# Patient Record
Sex: FEMALE | Race: WHITE | NOT HISPANIC OR LATINO | Employment: FULL TIME | URBAN - METROPOLITAN AREA
[De-identification: names, ages, dates, MRNs, and addresses within clinical notes are randomized per-mention and may not be internally consistent; named-entity substitution may affect disease eponyms.]

---

## 2018-10-05 ENCOUNTER — TELEPHONE (OUTPATIENT)
Dept: NEUROLOGY | Facility: CLINIC | Age: 53
End: 2018-10-05

## 2018-10-05 NOTE — TELEPHONE ENCOUNTER
Since this is urgent request I scheduled 10/18 with dr carmona.pt was happy with that. 10/05/18/0940/mikayla

## 2018-10-08 ENCOUNTER — TELEPHONE (OUTPATIENT)
Dept: NEUROLOGY | Facility: CLINIC | Age: 53
End: 2018-10-08

## 2018-10-08 ENCOUNTER — OFFICE VISIT (OUTPATIENT)
Dept: NEUROLOGY | Facility: CLINIC | Age: 53
End: 2018-10-08
Payer: COMMERCIAL

## 2018-10-08 VITALS
RESPIRATION RATE: 20 BRPM | WEIGHT: 231.69 LBS | HEART RATE: 74 BPM | SYSTOLIC BLOOD PRESSURE: 128 MMHG | DIASTOLIC BLOOD PRESSURE: 84 MMHG | BODY MASS INDEX: 39.55 KG/M2 | HEIGHT: 64 IN

## 2018-10-08 DIAGNOSIS — Z86.69 HISTORY OF EPILEPSY: ICD-10-CM

## 2018-10-08 DIAGNOSIS — R55 FAINTING SPELL: ICD-10-CM

## 2018-10-08 DIAGNOSIS — F43.29 STRESS AND ADJUSTMENT REACTION: ICD-10-CM

## 2018-10-08 PROCEDURE — 3008F BODY MASS INDEX DOCD: CPT | Mod: CPTII,S$GLB,, | Performed by: PSYCHIATRY & NEUROLOGY

## 2018-10-08 PROCEDURE — 99999 PR PBB SHADOW E&M-EST. PATIENT-LVL III: CPT | Mod: PBBFAC,,, | Performed by: PSYCHIATRY & NEUROLOGY

## 2018-10-08 PROCEDURE — 99205 OFFICE O/P NEW HI 60 MIN: CPT | Mod: S$GLB,,, | Performed by: PSYCHIATRY & NEUROLOGY

## 2018-10-08 RX ORDER — LEVETIRACETAM 500 MG/1
500 TABLET ORAL 2 TIMES DAILY
Qty: 60 TABLET | Refills: 11 | Status: SHIPPED | OUTPATIENT
Start: 2018-10-08 | End: 2019-10-08

## 2018-10-08 RX ORDER — IBUPROFEN 200 MG
200 TABLET ORAL EVERY 6 HOURS PRN
COMMUNITY

## 2018-10-08 RX ORDER — ACETAMINOPHEN 325 MG/1
325 TABLET ORAL EVERY 6 HOURS PRN
COMMUNITY

## 2018-10-08 NOTE — TELEPHONE ENCOUNTER
----- Message from Harman Guillaume sent at 10/8/2018 11:21 AM CDT -----  Contact: [phone]   Primary Care doctor fax number is  [phone] and sen it to the Attention of  Rama Whiting.  Please call back at [phone]. x-AH

## 2018-10-08 NOTE — PROGRESS NOTES
Subjective:       Patient ID: Melanie Pedro is a 52 y.o. female.    Chief Complaint:  Seizures (consult)      HPI  HISTORY OF PRESENT ILLNESS:  This is a 52-year-old right-handed lady with   history of seizure in the past, who presented today in the clinic for evaluation   of recent seizures incidence on 10/03/2018.  She is a schoolteacher, recently   removed to a new school and in the new school she is under a lot of mental   pressure, stress, anxiety because of the environment there.  As per her seizure   history, she said that she might have fainting spell as a child, but never was   taken to the doctor.  At age 19, she has first grand mal seizure and after that   she has some mixed episodes, some grand mal, some staring spells.  Her last   staring spell was at age 23.  Usually, she did not take any medications and she   has not been evaluated thoroughly at that age.  After that, she did not have any   incidence until recently.  She is going through a lot of mental pressure,   stress, anxiety continuously.  So on October 3rd at school, she had the   convulsive seizure taken to the hospital, but in the hospital, CT of head and CT of  the C-spine   was done and came out unremarkable.  Hospital records showed that she has no   incontinence or tongue biting, but she said now that she has some incontinence.    In this situation, she is here for neurological evaluations to be done   thoroughly.    Social History     Tobacco Use    Smoking status: Never Smoker   Substance Use Topics    Alcohol use: No     Frequency: Never    Drug use: No     Review of patient's allergies indicates:   Allergen Reactions    Latex, natural rubber Swelling    Decadron [dexamethasone sodium phosphate]        Scheduled Meds:          Review of Systems   Constitutional: Negative for activity change, appetite change, diaphoresis, fatigue, fever and unexpected weight change.   HENT: Negative for drooling, facial swelling, hearing loss,  tinnitus and voice change.    Eyes: Negative for photophobia, pain and visual disturbance.   Respiratory: Negative for apnea, cough, chest tightness and shortness of breath.    Cardiovascular: Negative for chest pain, palpitations and leg swelling.   Gastrointestinal: Negative for nausea.   Genitourinary: Negative for difficulty urinating, dyspareunia, flank pain, frequency and pelvic pain.   Musculoskeletal: Negative for arthralgias, back pain, gait problem, joint swelling, myalgias, neck pain and neck stiffness.   Skin: Negative for pallor and rash.   Neurological: Positive for seizures. Negative for dizziness, tremors, syncope, facial asymmetry, speech difficulty, weakness, light-headedness, numbness and headaches.   Hematological: Does not bruise/bleed easily.   Psychiatric/Behavioral: Negative for agitation, behavioral problems, confusion, decreased concentration, dysphoric mood, hallucinations, sleep disturbance and suicidal ideas. The patient is nervous/anxious.        Objective:        Neurologic Exam     Mental Status   Oriented to person, place, and time.   Recall at 5 minutes: recalls 3 of 3 objects. Follows 3 step commands.   Speech: speech is normal   Level of consciousness: alert  Knowledge: good and consistent with education. Able to perform simple calculations.   Able to name object. Able to read. Able to repeat. Normal comprehension.     Cranial Nerves     CN II   Visual fields full to confrontation.   Visual acuity: normal    CN III, IV, VI   Pupils are equal, round, and reactive to light.  Extraocular motions are normal.   Right pupil: Shape: regular. Reactivity: brisk. Consensual response: intact. Accommodation: intact.   Left pupil: Shape: regular. Reactivity: brisk. Consensual response: intact. Accommodation: intact.   CN III: no CN III palsy  CN VI: no CN VI palsy  Nystagmus: none   Diplopia: none  Ophthalmoparesis: none  Upgaze: normal  Downgaze: normal  Conjugate gaze:  present  Vestibulo-ocular reflex: present    CN VIII   CN VIII normal.     CN IX, X   CN IX normal.   CN X normal.   Palate: symmetric  Right gag reflex: normal  Left gag reflex: normal    CN XI   CN XI normal.     CN XII   CN XII normal.     Motor Exam   Muscle bulk: normal  Overall muscle tone: normal  Right arm tone: normal  Left arm tone: normal  Right leg tone: normal  Left leg tone: normal    Strength   Strength 5/5 throughout.     Sensory Exam   Light touch normal.   Proprioception normal.   Pinprick normal.   Graphesthesia: normal  Stereognosis: normal    Gait, Coordination, and Reflexes     Gait  Gait: normal    Coordination   Romberg: negative  Finger to nose coordination: normal  Heel to shin coordination: normal  Tandem walking coordination: normal    Tremor   Resting tremor: absent  Intention tremor: absent  Action tremor: absent    Reflexes   Right brachioradialis: 2+  Left brachioradialis: 2+  Right biceps: 2+  Left biceps: 2+  Right triceps: 2+  Left triceps: 2+  Right patellar: 2+  Left patellar: 2+  Right achilles: 2+  Left achilles: 2+  Right : 2+  Left : 2+  Right plantar: normal  Left plantar: normal  Right ankle clonus: absent  Left ankle clonus: absent             Assessment:     Problem List Items Addressed This Visit     History of epilepsy    Current Assessment & Plan     She is under a lot of stress and anxiety which might be triggering the seizure..  MRI of the brain and EEG are required.         Stress and adjustment reaction    Fainting spell               Plan:   MRI of the brain. EEG and Keppra 500 mg bid  Time spent: 40 minutes in face to face discussion concerning diagnosis, prognosis, review of lab and test results, benefits of treatment as well as management of disease, counseling of patient and coordination of care between various health care providers . Greater than half the time spent was used for coordination of care and counseling of patient. This note may have some  spelling or wording mistakes which might have been overlooked during proof reading.

## 2018-10-08 NOTE — TELEPHONE ENCOUNTER
----- Message from Neeta Granados sent at 10/8/2018 10:42 AM CDT -----  Contact: Patient  Patient needs a Dr note for her to give to her daughters school because her daughter had to bring her to her appt today, please call her back at [phone]. Thank you

## 2018-10-08 NOTE — TELEPHONE ENCOUNTER
Pt needs a school letter for bringing her here. Printed it to fax to her at her pcp office.since dhe is on her way there and forgot to get it while here.10/08/18/1050/sf

## 2018-10-08 NOTE — TELEPHONE ENCOUNTER
----- Message from Alea Monsivais sent at 10/8/2018 11:01 AM CDT -----  Contact: self/[phone]  Returning call to Cammy Heller, please call back at [phone]. Thanks/ar

## 2018-10-08 NOTE — ASSESSMENT & PLAN NOTE
She is under a lot of stress and anxiety which might be triggering the seizure..  MRI of the brain and EEG are required.

## 2018-10-08 NOTE — LETTER
October 8, 2018      Rory Lang MD  [address]  [place] [address]           [facility]  [id number] [place] [id number]  Phone: [phone]  Fax: [phone]          Patient: Melanie Pedro   MR Number: [id number]   YOB: 1965   Date of Visit: 10/8/2018       Dear Dr. Rory Lang:    Thank you for referring Melanie Pedro to me for evaluation. Attached you will find relevant portions of my assessment and plan of care.    If you have questions, please do not hesitate to call me. I look forward to following Melanie Pedro along with you.    Sincerely,    Morgan Ng MD    Enclosure  CC:  No Recipients    If you would like to receive this communication electronically, please contact [email or web address] or [phone] to request more information on [facility] access.    For providers and/or their staff who would like to refer a patient to [facility], please contact us through our one-stop-shop provider referral line, [facility], at [phone].    If you feel you have received this communication in error or would no longer like to receive these types of communications, please e-mail [email or web address]

## 2018-10-11 ENCOUNTER — PATIENT MESSAGE (OUTPATIENT)
Dept: NEUROLOGY | Facility: CLINIC | Age: 53
End: 2018-10-11

## 2018-10-11 ENCOUNTER — TELEPHONE (OUTPATIENT)
Dept: NEUROLOGY | Facility: CLINIC | Age: 53
End: 2018-10-11

## 2018-10-11 NOTE — TELEPHONE ENCOUNTER
----- Message from Umu Gould sent at 10/11/2018 10:27 AM CDT -----  Contact: self   Requesting a call back regarding medical leave information. Please call back at [phone].      Thanks,  Umu Gould

## 2018-10-11 NOTE — TELEPHONE ENCOUNTER
----- Message from Rocael Morris sent at 10/11/2018 12:50 PM CDT -----  Contact: Meg [phone]  Patient is returning a missed call to Minnie.

## 2018-10-11 NOTE — TELEPHONE ENCOUNTER
Pt is worried about effects of keppra since daughter was unable to take for same dx. Wants to know if he can change to topamax instead. Sent a message to dr jo. 10/11/18/1305/sf

## 2018-10-12 RX ORDER — TOPIRAMATE 25 MG/1
TABLET ORAL
Qty: 60 TABLET | Refills: 11 | Status: SHIPPED | OUTPATIENT
Start: 2018-10-12 | End: 2018-10-30 | Stop reason: SDUPTHER

## 2018-10-16 ENCOUNTER — HOSPITAL ENCOUNTER (OUTPATIENT)
Dept: PULMONOLOGY | Facility: HOSPITAL | Age: 53
Discharge: HOME | End: 2018-10-16
Attending: PSYCHIATRY & NEUROLOGY
Payer: COMMERCIAL

## 2018-10-16 ENCOUNTER — TELEPHONE (OUTPATIENT)
Dept: NEUROLOGY | Facility: CLINIC | Age: 53
End: 2018-10-16

## 2018-10-16 DIAGNOSIS — R55 FAINTING SPELL: ICD-10-CM

## 2018-10-16 DIAGNOSIS — Z86.69 HISTORY OF EPILEPSY: ICD-10-CM

## 2018-10-16 DIAGNOSIS — F43.29 STRESS AND ADJUSTMENT REACTION: ICD-10-CM

## 2018-10-16 PROCEDURE — 95816 EEG AWAKE AND DROWSY: CPT

## 2018-10-16 NOTE — TELEPHONE ENCOUNTER
----- Message from Payal Webster MA sent at 10/16/2018  8:00 AM CDT -----   Please advise  ----- Message -----  From: Morgan Ng MD  Sent: 10/12/2018   5:53 PM  To: Payal Webster MA    Wait for EEG report.  ----- Message -----  From: Payal Webster MA  Sent: 10/12/2018   1:22 PM  To: Morgan Ng MD    Please advise  ----- Message -----  From: Brianna Aceves  Sent: 10/12/2018   1:18 PM  To: Hernandez GARCIA Staff    states that since 8/19 she's had 3 absent seizures, 1 had to be taken to hospital, 1 tonic seizure (lastest 10/10)...just needs to clarify meaning of back to back seizures discussed earlier. also wants to know if she still isn't able to drive..[phone] ([place])

## 2018-10-17 NOTE — PROCEDURES
Referring physician: Dr. Ng     Technician : Flcao Quezada     Recording time:               20 minutes    Artifacts:   Eye movements , muscle .    Age:        52                                          Sex: F      History:   This is a 52-year-old right-handed lady with history of seizure in the past, who presented today in the clinic for evaluation of recent seizures incidence on 10/03/2018.  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  As per her seizure history, she said that she might have fainting spell as a child, but never was taken to the doctor.  At age 19, she has first grand mal seizure and after that she has some mixed episodes, some grand mal, some staring spells.  Her last staring spell was at age 23.  Usually, she did not take any medications and she has not been evaluated thoroughly at that age.  After that, she did not have anyincidence until recently.  She is going through a lot of mental pressure,   stress, anxiety continuously.  So on October 3rd at school, she had the convulsive seizure taken to the hospital, but in the hospital, CT of head and CT of  the C-spine was done and came out unremarkable    Technique : Electrodes are placed according to International 10-20 system . Bipolar and referential montages are used. Hyperventilation was   done   . Photic was  done.      Findings:  This is a multichannel digital EEG recording using the international 10-20 placement system. The resting record is fairly well organized and symmetric. A dominant posterior rhythm is seen. It consists of a 10  hertz 20-70 microvolt alpha rhythm. This attenuates with eye opening. During drowsiness, there is mild attenuation and slowing of the background rhythm. Stage II sleep was  achieved. Hyperventilation was     performed. Photic stimulation was  done .There is no change in the back  ground .There is no spikes or spike waves activities in this EEG. No seizure or epileptiform discharge are appreciated.  There is no focal attenuation or side to side variation. No clinical seizure activity was noted by the EEG technician.     IMPRESSION:  This is a normal awake and drowsy EEG study. Clinical correlation appreciated.

## 2018-10-30 RX ORDER — TOPIRAMATE 25 MG/1
TABLET ORAL
Qty: 60 TABLET | Refills: 11 | Status: SHIPPED | OUTPATIENT
Start: 2018-10-30

## 2018-10-30 NOTE — TELEPHONE ENCOUNTER
----- Message from [place] sent at 10/30/2018 12:40 PM CDT -----  1. What is the name of the medication you are requesting? topamax  2. What is the dose? 25mg now but dr said 75mg for next wk & then 100mg  3. How do you take the medication? Orally, topically, etc? oral  4. How often do you take this medication? 25 mg 3/day  5. Do you need a 30 day or 90 day supply? n/a  6. How many refills are you requesting? n/a  7. What is your preferred pharmacy and location of the pharmacy?   [facility] [address] [place] [address], [place] - [address] [facility]  [address] [facility]  [place] [address]  Phone: [phone] Fax: [phone]    8. Who can we contact with further questions? [phone] (home)

## 2019-01-11 ENCOUNTER — TELEPHONE (OUTPATIENT)
Dept: NEUROLOGY | Facility: CLINIC | Age: 54
End: 2019-01-11

## 2019-01-11 NOTE — TELEPHONE ENCOUNTER
Advised pt to call the number on back of card to see if she still has coverage for her visit. She says she has applied for v.a. Benefits also and will let us know what she finds out.1/11/1911/19/1056/sf

## 2019-01-11 NOTE — TELEPHONE ENCOUNTER
----- Message from Bear Muller sent at 1/11/2019  9:47 AM CST -----  Contact: Pt.   Pt is returning missed call ..[phone] (home) Pt states that she is needing nurse to contact insurance company to se if Pt still as coverage for this appt.          Thank You  Alexus Muller

## 2019-01-14 ENCOUNTER — TELEPHONE (OUTPATIENT)
Dept: NEUROLOGY | Facility: CLINIC | Age: 54
End: 2019-01-14

## 2019-01-14 ENCOUNTER — OFFICE VISIT (OUTPATIENT)
Dept: NEUROLOGY | Facility: CLINIC | Age: 54
End: 2019-01-14
Payer: COMMERCIAL

## 2019-01-14 VITALS
HEART RATE: 62 BPM | RESPIRATION RATE: 20 BRPM | HEIGHT: 63 IN | WEIGHT: 237.44 LBS | SYSTOLIC BLOOD PRESSURE: 130 MMHG | DIASTOLIC BLOOD PRESSURE: 78 MMHG | BODY MASS INDEX: 42.07 KG/M2

## 2019-01-14 DIAGNOSIS — F43.29 STRESS AND ADJUSTMENT REACTION: ICD-10-CM

## 2019-01-14 DIAGNOSIS — Z86.69 HISTORY OF EPILEPSY: Primary | ICD-10-CM

## 2019-01-14 PROCEDURE — 99999 PR PBB SHADOW E&M-EST. PATIENT-LVL III: ICD-10-PCS | Mod: PBBFAC,,, | Performed by: PSYCHIATRY & NEUROLOGY

## 2019-01-14 PROCEDURE — 3008F PR BODY MASS INDEX (BMI) DOCUMENTED: ICD-10-PCS | Mod: CPTII,S$GLB,, | Performed by: PSYCHIATRY & NEUROLOGY

## 2019-01-14 PROCEDURE — 99214 PR OFFICE/OUTPT VISIT, EST, LEVL IV, 30-39 MIN: ICD-10-PCS | Mod: S$GLB,,, | Performed by: PSYCHIATRY & NEUROLOGY

## 2019-01-14 PROCEDURE — 99214 OFFICE O/P EST MOD 30 MIN: CPT | Mod: S$GLB,,, | Performed by: PSYCHIATRY & NEUROLOGY

## 2019-01-14 PROCEDURE — 3008F BODY MASS INDEX DOCD: CPT | Mod: CPTII,S$GLB,, | Performed by: PSYCHIATRY & NEUROLOGY

## 2019-01-14 PROCEDURE — 99999 PR PBB SHADOW E&M-EST. PATIENT-LVL III: CPT | Mod: PBBFAC,,, | Performed by: PSYCHIATRY & NEUROLOGY

## 2019-01-14 RX ORDER — MECLIZINE HYDROCHLORIDE CHEWABLE TABLETS 25 MG/1
16 TABLET, CHEWABLE ORAL 3 TIMES DAILY PRN
COMMUNITY

## 2019-01-14 NOTE — PROGRESS NOTES
Subjective:       Follow up:   Seizures (consult)      HPI  She has stopped taking Keppra and started Topamax in the beginning but she felt some side effects , like upset stomach  With 25 , sleepiness with 75 mg and above. She also developed some mild reaction to higher doses. She backed down to 25 mg bid.  Last seizure was in October 10th.    Social History     Tobacco Use    Smoking status: Never Smoker   Substance Use Topics    Alcohol use: No     Frequency: Never    Drug use: No     Review of patient's allergies indicates:   Allergen Reactions    Latex, natural rubber Swelling    Decadron [dexamethasone sodium phosphate]        Scheduled Meds:          Review of Systems   Constitutional: Negative for activity change, appetite change, diaphoresis, fatigue, fever and unexpected weight change.   HENT: Negative for drooling, facial swelling, hearing loss, tinnitus and voice change.    Eyes: Negative for photophobia, pain and visual disturbance.   Respiratory: Negative for apnea, cough, chest tightness and shortness of breath.    Cardiovascular: Negative for chest pain, palpitations and leg swelling.   Gastrointestinal: Negative for nausea.   Genitourinary: Negative for difficulty urinating, dyspareunia, flank pain, frequency and pelvic pain.   Musculoskeletal: Negative for arthralgias, back pain, gait problem, joint swelling, myalgias, neck pain and neck stiffness.   Skin: Negative for pallor and rash.   Neurological: Positive for seizures. Negative for dizziness, tremors, syncope, facial asymmetry, speech difficulty, weakness, light-headedness, numbness and headaches.   Hematological: Does not bruise/bleed easily.   Psychiatric/Behavioral: Negative for agitation, behavioral problems, confusion, decreased concentration, dysphoric mood, hallucinations, sleep disturbance and suicidal ideas. The patient is nervous/anxious.        Objective:        Neurologic Exam     Mental Status   Oriented to person, place, and  time.   Recall at 5 minutes: recalls 3 of 3 objects. Follows 3 step commands.   Speech: speech is normal   Level of consciousness: alert  Knowledge: good and consistent with education. Able to perform simple calculations.   Able to name object. Able to read. Able to repeat. Normal comprehension.     Cranial Nerves     CN II   Visual fields full to confrontation.   Visual acuity: normal    CN III, IV, VI   Pupils are equal, round, and reactive to light.  Extraocular motions are normal.   Right pupil: Shape: regular. Reactivity: brisk. Consensual response: intact. Accommodation: intact.   Left pupil: Shape: regular. Reactivity: brisk. Consensual response: intact. Accommodation: intact.   CN III: no CN III palsy  CN VI: no CN VI palsy  Nystagmus: none   Diplopia: none  Ophthalmoparesis: none  Upgaze: normal  Downgaze: normal  Conjugate gaze: present  Vestibulo-ocular reflex: present    CN VIII   CN VIII normal.     CN IX, X   CN IX normal.   CN X normal.   Palate: symmetric  Right gag reflex: normal  Left gag reflex: normal    CN XI   CN XI normal.     CN XII   CN XII normal.     Motor Exam   Muscle bulk: normal  Overall muscle tone: normal  Right arm tone: normal  Left arm tone: normal  Right leg tone: normal  Left leg tone: normal    Strength   Strength 5/5 throughout.     Sensory Exam   Light touch normal.   Proprioception normal.   Pinprick normal.   Graphesthesia: normal  Stereognosis: normal    Gait, Coordination, and Reflexes     Gait  Gait: normal    Coordination   Romberg: negative  Finger to nose coordination: normal  Heel to shin coordination: normal  Tandem walking coordination: normal    Tremor   Resting tremor: absent  Intention tremor: absent  Action tremor: absent    Reflexes   Right brachioradialis: 2+  Left brachioradialis: 2+  Right biceps: 2+  Left biceps: 2+  Right triceps: 2+  Left triceps: 2+  Right patellar: 2+  Left patellar: 2+  Right achilles: 2+  Left achilles: 2+  Right : 2+  Left :  2+  Right plantar: normal  Left plantar: normal  Right ankle clonus: absent  Left ankle clonus: absent     EEG: Normal.        Assessment:     Problem List Items Addressed This Visit     History of epilepsy    Current Assessment & Plan     She is under a lot of stress and anxiety which might be triggering the seizure.. Topamax is lower dose , not protecting  Seizure , if so. Sometimes, stress from hostile environment can be a trigger.           Stress and adjustment reaction    Fainting spell               Plan:     Time spent: 40 minutes in face to face discussion concerning diagnosis, prognosis, review of lab and test results, benefits of treatment as well as management of disease, counseling of patient and coordination of care between various health care providers . Greater than half the time spent was used for coordination of care and counseling of patient. This note may have some spelling or wording mistakes which might have been overlooked during proof reading.

## 2019-01-14 NOTE — TELEPHONE ENCOUNTER
----- Message from Kira Lechuga sent at 1/14/2019 12:24 PM CST -----  Contact: pt  She's calling in regards to visit today 1/14 pls call pt back at [phone] (home)

## 2019-01-14 NOTE — TELEPHONE ENCOUNTER
I called pt to see what she wanted and she wants to know if he addressed her stress at work that triggers her seizures but he did make a general statement of the stress make trigger seizures. And he said that was all he was going to write.1/14/1914/19/1236/sf

## 2019-05-15 ENCOUNTER — TELEPHONE (OUTPATIENT)
Dept: NEUROLOGY | Facility: CLINIC | Age: 54
End: 2019-05-15

## 2019-10-03 NOTE — TELEPHONE ENCOUNTER
----- Message from Bobbi Chris sent at 10/5/2018  9:15 AM CDT -----  Contact: PT  She would like a sooner appt, she has been having grandmaw seizures. States her daughter found her yesterday and her body was shaking and her eyes were rolled back. Nothing available until 11/13. Please call pt at [phone]. Thank you    Negative